# Patient Record
Sex: FEMALE | Race: WHITE
[De-identification: names, ages, dates, MRNs, and addresses within clinical notes are randomized per-mention and may not be internally consistent; named-entity substitution may affect disease eponyms.]

---

## 2018-09-27 ENCOUNTER — HOSPITAL ENCOUNTER (OUTPATIENT)
Dept: HOSPITAL 89 - DIET | Age: 33
LOS: 35 days | End: 2018-11-01
Attending: NURSE PRACTITIONER
Payer: MEDICAID

## 2018-09-27 VITALS
HEIGHT: 64 IN | BODY MASS INDEX: 35 KG/M2 | WEIGHT: 205 LBS | WEIGHT: 205 LBS | BODY MASS INDEX: 35 KG/M2 | HEIGHT: 64 IN

## 2018-09-27 DIAGNOSIS — R63.5: Primary | ICD-10-CM

## 2018-09-27 PROCEDURE — 97802 MEDICAL NUTRITION INDIV IN: CPT

## 2018-09-27 NOTE — MEDICAL NUTRITION THERAPY
Nutrition Anthropometrics


Height (Inches):  64


Weight (Pounds):  205 (standing scale with shoes)


BMI:  35.2


Alfonzo Nutrition Score:          


Alfonzo Nutrition Risk Score:


Dietary Referral


Nutrition Risk Factors:      


Nutrition Risk Comment:





Nutrition/Food History


Breakfast:  eggs and cheese ( not much of a breakfast person)


Lunch:  Some type of meat with veggies


Dinner:  Steak, chicken, or pork, with potatoes corn or salad


Snacks:  grapes or chips





Nutritional Education


Nutrition Education Topic:  Weight Loss Diet


Learning Readiness:  Eager, Interested


Teaching Methods:  Discussion, Handout, Demonstration


Response to Teaching:  Verbalize understanding


Teaching Recipient:  Patient


Nutrition Counselin/27. Met with pt. She is concerned about losing weight so she can get PG.


Pt has tried multiple diet in the past and has been very "ridged" then


goes off and re-gains.  Disussed importance of lifestlye changes rather


than diet.  Focused on eating 3 balanced meals a day, making half the


plate veggeis, 5-6 oz of meat and 1 cup of carbs. Pt noted she tends to


eat a lot at


night for evening meal. Eats lunch at noon then doesn't eat dinner


[id number]. 7 hours without food, recommended pt eat a snack between lunch


and dinner so pt doesnt overeat at dinner meal. discussed how light bffrt


and lunch may cause more hunger in afternoon.  Looking at a 7937-2288 kcal


diet per day, which may decrease depending on tolerance and wt loss.





Nutrition Monitoring & Eval


RD Patient Assessment Time:  60 minutes


RD Assessment Type:  RD Education


Nutritional Comment:  


Provided 60 minutes MNT for wt loss with BMI of 35.2





Copies To


Copies to:   MILLY LUZ ;











NESS BARRERA                    Sep 27, 2018 15:54

## 2019-07-07 ENCOUNTER — HOSPITAL ENCOUNTER (INPATIENT)
Dept: HOSPITAL 89 - OB | Age: 34
LOS: 2 days | Discharge: HOME | End: 2019-07-09
Attending: OBSTETRICS & GYNECOLOGY | Admitting: OBSTETRICS & GYNECOLOGY
Payer: MEDICAID

## 2019-07-07 VITALS — SYSTOLIC BLOOD PRESSURE: 129 MMHG | DIASTOLIC BLOOD PRESSURE: 65 MMHG

## 2019-07-07 VITALS
WEIGHT: 235 LBS | HEIGHT: 64 IN | HEIGHT: 64 IN | BODY MASS INDEX: 40.12 KG/M2 | WEIGHT: 235 LBS | BODY MASS INDEX: 40.12 KG/M2

## 2019-07-07 VITALS — SYSTOLIC BLOOD PRESSURE: 124 MMHG | DIASTOLIC BLOOD PRESSURE: 72 MMHG

## 2019-07-07 VITALS — DIASTOLIC BLOOD PRESSURE: 71 MMHG | SYSTOLIC BLOOD PRESSURE: 129 MMHG

## 2019-07-07 DIAGNOSIS — Z3A.40: ICD-10-CM

## 2019-07-07 DIAGNOSIS — E03.9: ICD-10-CM

## 2019-07-07 LAB — PLATELET COUNT, AUTOMATED: 250 K/UL (ref 150–450)

## 2019-07-07 PROCEDURE — 36415 COLL VENOUS BLD VENIPUNCTURE: CPT

## 2019-07-07 PROCEDURE — 86850 RBC ANTIBODY SCREEN: CPT

## 2019-07-07 PROCEDURE — 86901 BLOOD TYPING SEROLOGIC RH(D): CPT

## 2019-07-07 PROCEDURE — 0KQM0ZZ REPAIR PERINEUM MUSCLE, OPEN APPROACH: ICD-10-PCS | Performed by: OBSTETRICS & GYNECOLOGY

## 2019-07-07 PROCEDURE — 85025 COMPLETE CBC W/AUTO DIFF WBC: CPT

## 2019-07-07 PROCEDURE — 85027 COMPLETE CBC AUTOMATED: CPT

## 2019-07-07 PROCEDURE — 86900 BLOOD TYPING SEROLOGIC ABO: CPT

## 2019-07-07 RX ADMIN — BUPIVACAINE HYDROCHLORIDE PRN ML: 2.5 INJECTION, SOLUTION EPIDURAL; INFILTRATION; INTRACAUDAL; PERINEURAL at 18:19

## 2019-07-07 RX ADMIN — WITCH HAZEL PRN PKG: 5 CLOTH TOPICAL at 22:59

## 2019-07-07 RX ADMIN — SODIUM CHLORIDE, SODIUM LACTATE, POTASSIUM CHLORIDE, AND CALCIUM CHLORIDE PRN MLS/HR: 600; 310; 30; 20 INJECTION, SOLUTION INTRAVENOUS at 18:21

## 2019-07-07 RX ADMIN — SODIUM CHLORIDE, SODIUM LACTATE, POTASSIUM CHLORIDE, AND CALCIUM CHLORIDE PRN MLS/HR: 600; 310; 30; 20 INJECTION, SOLUTION INTRAVENOUS at 16:45

## 2019-07-07 RX ADMIN — DOCUSATE CALCIUM SCH MG: 240 CAPSULE, LIQUID FILLED ORAL at 22:59

## 2019-07-07 RX ADMIN — BUPIVACAINE HYDROCHLORIDE PRN ML: 2.5 INJECTION, SOLUTION EPIDURAL; INFILTRATION; INTRACAUDAL; PERINEURAL at 17:00

## 2019-07-07 RX ADMIN — IBUPROFEN SCH MG: 800 TABLET ORAL at 22:59

## 2019-07-07 NOTE — ANESTHESIA PROGRESS NOTE
Progress/Maintenance


Anesthesia Note Date:  2019


Anesthesia Note Time:  20:18


Pain Intensity:  0


Pump:  Off


Pump Rate (ML/HR):  0


Sensory Level:  L4 L, L 5 rt


Motor Level:  Bending Knees-Bilateral





Assessment and Plan


Assessment:  


Delivery at 1927 female . pt expresses satisfaction with Labor


epidural for relief of pain. She is in bed with baby at breast. The pump


has been off since 1950 when vaginal repair and manual placental


extraction were completed. The epidural catheter is disconnected and


sterilly capped with syringe.


Assessment


Stable alert. residual block receding.


Anesthesia Stop Day:  2019


Anesthesia Stop Time:  19:50











HANS ADKINS CRNA               2019 20:28

## 2019-07-07 NOTE — ANESTHESIA PROGRESS NOTE
Progress/Maintenance


Anesthesia Note Date:  Jul 7, 2019


Anesthesia Note Time:  17:08


Pain Intensity:  6


Pump:  On


Pump Rate (ML/HR):  7


Sensory Level:  T8 left T11 right


Motor Level:  Bending Knees-Bilateral


Position:  Right, Lateral


Drug Bolus:  0.25% Marcaine (6 ml), Other (Fentanyl 50 mcg)


Report Received From:  Other (Miri Rome CRNA)


Care Assumed By:  Other (Hans Garduno CRNA)











HANS GARDUNO CRNA               Jul 7, 2019 20:18

## 2019-07-07 NOTE — OB DELIVERY NOTE
Delivery Note


Vaginal Delivery Type:  Spont. Vaginal Delivery


Delivery Date:  Jul 7, 2019


Delivery Time:  19:27


Estimated Gestational Age(wks):  40.3


Delivery Anesthesia:  Epidural


Infant Sex:  Female


Repair Needed:  Laceration, 2nd Degree


Estimated Blood Loss:  300


Delivery Complications:  Retained Placenta (manual removal)


Notes:


Admitted at 6 cm in active labor.  Progressed after epidural to complete and 


allowed to labor down.  When +2 station, set up for delivery in dorsal lithotomy


position.  Over 2 contractions delivered head over second degree laceration.  


Shoulders delivered spontaneously and rest of baby delivered easily.  The cord 


spontaneously evulsed without much tension and placenta required manual removal.


 A second sweep manually to ensure all membranes and placenta had bee removed 


was performed.  On single dose of Mefoxin 2 gm IV administered to cover 


infection risk.  Repair with 2-0 Chromic without complication.


Pediatrician in Attendence:  No


Copies to:   JIA SYKES MD ;











JIA SYKES MD              Jul 7, 2019 19:58

## 2019-07-07 NOTE — PROCEDURE NOTE
Anesthetic Placement Note


Anesthesia Plan:  CSE


Permit for Anesthesia Signed:  Yes


Anesthesia Technique:  Patient Sitting


Anesthesia Prep:  Chlorhexidine


Interspace:  L 4-5


Local Anesthetic:  1% Lidocaine, 25 Gauge Needle


Amount Local - cc's:  3


Anesthesia Needle:  17g Touhy/Schliff


Anesthesia Attempts:  1


Loss of Resistance:  Normal Saline


Depth of TANVI (cm):  7


Epidural Needle Placement:  No CSF, No Blood, No Parasthesia


Intrathecal Needle:  27 Gauge Pencan


Cerebral Spinal Fluid:  Other (unable to get and caused lt sided parasthesia so 


aborted CSE)


Catheter Insertion (cm):  12


Catheter Type:  Maurice - Spring Wound


Epidural Dressing:  Tegaderm, Tape


Anesthesia Tray:  Lot Number ([id number]), Expiration Date (08312020)





Anesthesia Medications:


Epidural Test Dose:  1.5 Lido/Epi (1:200,000), Dose - mL (3), Time (1659), 


Negative


Epidural Loading Dose:  0.25% Marcaine, Dose - ml (5), Time (1700)


Epidural Infusion:  0.2% Ropivicaine, With Fentanyl 2mcg/ml, Start Time: (1700)


Epidural Pump Setting:  Bolus Dose - mL (3), Lockout - Minutes (30), Maintenance


Rate - mL/hr (7), Maximum per Hour - mL (13)


Complications:  None


Comment:


Pt tolerated procedure well. Vital signs stable. Patient becoming comfortable 


and condition stable.











GARCIA JONES CRNA            Jul 7, 2019 17:13

## 2019-07-07 NOTE — HISTORY & PHYSICAL
History of Present Illness


Age of Patient:  34


:  2


Para or TPAL:  1


EDC per LMP:  2019


Estimated Gestational Age:  40.3


Chief Complaint


Labor


History of Present Illness


Presents in active labor.  Pregnancy complicated by hypothyroidism but well 


controlled with replacement.  Past Medical, Surgical, Family and Obstetric 


Histories reviewed. Please see [facility] prenatal chart.





History


Allergies:  


Coded Allergies:  


     No Known Drug Allergies (Unverified , 16)


Social History:  


: =Nathanael Fang


Denies use of alcohol, tobacco, or recreational drugs during pregnancy


Family History:  


FH: hypertension


  FATHER


  MOTHER


FH: lung cancer


  maternal grandma


  maternal grandpa





No Family History of:


  FH: asthma


  FH: diabetes mellitus


  FH: heart disease


  Malignant hyperthermia





Med Rec


Home Meds


Reported Medications


Levothyroxine Sodium (LEVOTHYROXINE SODIUM) 75 Mcg Tablet, 75 MCG PO QDAY, TAB


   19


Awv131/Iron Fumarate/Fa/Dss (PRENATAL 19 TABLET) 1 Each Tablet


   16


Discontinued Reported Medications


Levothyroxine Sodium (LEVOTHYROXINE SODIUM) 50 Mcg Tablet, 50 MCG PO QDAY, TAB


   16


Discontinued Scripts


Ibuprofen (IBUPROFEN) 800 Mg Tablet, 1 TAB PO Q8H PRN for pain, #30 TAB 0 


Refills


   TAKE WITH FOOD EVERY 8 HOURS


   Prov:JIGAR AREVALO MD         16


Acetaminophen With Codeine # 3 (ACETAMINOPHEN-COD #3 TABLET) 1 Each Tablet, 1-2 


EACH PO Q4H PRN for pain, #40 TAB 0 Refills


   Take 1-2 tablets as needed for pain no closer than every 4 hours.


   Prov:JIGAR AREVALO MD         16





Review of Systems


All Systems Reviewed/Normal:  Yes, Except as Noted





Exam


General Exam


Vital Signs





Vital Signs








  Date Time  Temp Pulse Resp B/P (MAP) Pulse Ox O2 Delivery O2 Flow Rate FiO2


 


19 18:00  96 18 124/72 (89) 97   


 


19 17:53 99.0     Room Air  








General Apperance:  Alert/Awake/No Acute Distress


Neuro:  No Gross deficits


Eyes:  Normal Extraocular Movement & Vison


Cardiovascular:  Regular Rate and Rhythm


Respiratory:  No Respiratory Distress


Abdomen:  Soft, Non-Tender, Non-Distended, Gravid - Non-Tender


Integumentary:  Skin Intact without Lesions or Rash


Psychological:  Alert & Oriented X3, Appropriate Mood & Affect





Pregnancy


Cervical Dialation:  6


Cervical Effacement (%):  100


Fetal Station:  0





Fetus


FHT Category:  I





Medical Decision Making


Data Points


Result Diagram:  


19 3078








VTE Prophylasis: Adult


Deep Vein Thrombosis/Pulmonary:  No


Pharmacological Contraindicati:  Pt at Low Risk for VTE


Mechanical Contraindications:  Pt at Low Risk for VTE





Assessment and Plan


OB/GYN Plan:  Routine Labor Care


Problems:  


(1) 40 weeks gestation of pregnancy


Status:  Resolved


Assessment & Plan:  Expecting 














JIA SYKES MD              2019 19:52

## 2019-07-07 NOTE — ANESTHESIA PROGRESS NOTE
Progress/Maintenance


Anesthesia Note Date:  Jul 7, 2019


Anesthesia Note Time:  18:30


Pain Intensity:  2


Pump:  On


Pump Rate (ML/HR):  7


Sensory Level:  T7 left  T9 right


Motor Level:  Bending Knees-Bilateral (Left motor block > right)


Position:  Semi-HANS Childress CRNA               Jul 7, 2019 20:20

## 2019-07-08 VITALS — DIASTOLIC BLOOD PRESSURE: 79 MMHG | SYSTOLIC BLOOD PRESSURE: 129 MMHG

## 2019-07-08 VITALS — DIASTOLIC BLOOD PRESSURE: 72 MMHG | SYSTOLIC BLOOD PRESSURE: 121 MMHG

## 2019-07-08 VITALS — DIASTOLIC BLOOD PRESSURE: 86 MMHG | SYSTOLIC BLOOD PRESSURE: 125 MMHG

## 2019-07-08 VITALS — SYSTOLIC BLOOD PRESSURE: 129 MMHG | DIASTOLIC BLOOD PRESSURE: 74 MMHG

## 2019-07-08 VITALS — SYSTOLIC BLOOD PRESSURE: 118 MMHG | DIASTOLIC BLOOD PRESSURE: 69 MMHG

## 2019-07-08 VITALS — DIASTOLIC BLOOD PRESSURE: 64 MMHG | SYSTOLIC BLOOD PRESSURE: 138 MMHG

## 2019-07-08 VITALS — SYSTOLIC BLOOD PRESSURE: 108 MMHG | DIASTOLIC BLOOD PRESSURE: 60 MMHG

## 2019-07-08 LAB — PLATELET COUNT, AUTOMATED: 207 K/UL (ref 150–450)

## 2019-07-08 RX ADMIN — IBUPROFEN SCH MG: 800 TABLET ORAL at 12:31

## 2019-07-08 RX ADMIN — DOCUSATE CALCIUM SCH MG: 240 CAPSULE, LIQUID FILLED ORAL at 08:27

## 2019-07-08 RX ADMIN — DOCUSATE CALCIUM SCH MG: 240 CAPSULE, LIQUID FILLED ORAL at 21:39

## 2019-07-08 RX ADMIN — IBUPROFEN SCH MG: 800 TABLET ORAL at 21:39

## 2019-07-08 RX ADMIN — IBUPROFEN SCH MG: 800 TABLET ORAL at 05:43

## 2019-07-08 NOTE — OB/GYN PROGRESS NOTE
OB Subjective


Progress Notes


Subjective


Feeling well. Pain well controlled and bleeding improving.  Voiding well.


GI:  NEG Nausea


:  Voiding Well


Pain:  Mild





OB Objective


Physical Exam





Vital Signs








  Date Time  Temp Pulse Resp B/P (MAP) Pulse Ox O2 Delivery O2 Flow Rate FiO2


 


7/8/19 07:35 98.2 59  118/69 (85)  Room Air  


 


7/7/19 18:00   18  97   














Intake and Output 


 


 7/8/19





 07:02


 


Intake Total 3700 ml


 


Output Total 3900 ml


 


Balance -200 ml


 


 


 


IV Total 1850 ml


 


Other 1850 ml


 


Output Urine Total 3900 ml


 


# Voids 1








General Appearance:  Alert/Awake/No Acute Distress


Neurological:  No Gross deficits


Eyes:  Normal Extraocular Movement & Vison


Cardiovascular:  Normal Rhythm & Peripheral Pulses, Regular Rate and Rhythm


Respiratory:  No Respiratory Distress, Clear to Auscultation


Abdomen:  Soft, Non-Tender, Non-Distended


Integumentary:  Skin Intact without Lesions or Rash


Psychological:  Alert & Oriented X3, Appropriate Mood & Affect


Result Diagram:  


7/8/19 0620








Assessment and Plan


OB/GYN Plan:  Routine Post-Partum Care, Discharge Home Tomorrow


Problems:  


(1) 40 weeks gestation of pregnancy


Status:  Resolved


(2) Postpartum care and examination immediately after delivery


Assessment & Plan:  Discussed precautions and instructions.  Planning discharge 


home tomorrow.  Continue comfort care today.














JIA SYKES MD              Jul 8, 2019 13:27

## 2019-07-08 NOTE — ANESTHESIA POST EVAL NOTE
Anesthesia Post Eval Note





Vital Signs








  Date Time  Temp Pulse Resp B/P (MAP) Pulse Ox O2 Delivery O2 Flow Rate FiO2


 


7/8/19 07:35 98.2 59  118/69 (85)  Room Air  


 


7/7/19 18:00   18  97   








Pt able to participate in Eval:  Yes


Cardiovascular Status:  Satisfactory


Respiratory Status:  Satisfactory


Pain Managment:  Satisfactory


PO Nausea/Vomiting:  Satisfactory


Temperature Management:  Satisfactory


Mental Status:  Satisfactory, Alert, Oriented X3


Post-Op Hydration Status:  Satisfactory, Tolerating PO Well, Voiding w/o 


Difficulty


Anesthesia Type:  LEB


Anesthesia Tolerance:


Pt has ambulated and is about to shower. Baby feeding well. Signs and sx of 


infection reviewed and pt agrees to seek medical advice should any occur.











HANS ADKINS CRNA               Jul 8, 2019 11:46

## 2019-07-09 VITALS — DIASTOLIC BLOOD PRESSURE: 83 MMHG | SYSTOLIC BLOOD PRESSURE: 117 MMHG

## 2019-07-09 VITALS — SYSTOLIC BLOOD PRESSURE: 108 MMHG | DIASTOLIC BLOOD PRESSURE: 59 MMHG

## 2019-07-09 VITALS — SYSTOLIC BLOOD PRESSURE: 120 MMHG | DIASTOLIC BLOOD PRESSURE: 81 MMHG

## 2019-07-09 VITALS — DIASTOLIC BLOOD PRESSURE: 95 MMHG | SYSTOLIC BLOOD PRESSURE: 127 MMHG

## 2019-07-09 RX ADMIN — IBUPROFEN SCH MG: 800 TABLET ORAL at 12:47

## 2019-07-09 RX ADMIN — WITCH HAZEL PRN PKG: 5 CLOTH TOPICAL at 12:47

## 2019-07-09 RX ADMIN — IBUPROFEN SCH MG: 800 TABLET ORAL at 05:00

## 2019-07-09 RX ADMIN — DOCUSATE CALCIUM SCH MG: 240 CAPSULE, LIQUID FILLED ORAL at 09:15

## 2019-07-09 NOTE — OB/GYN PROGRESS NOTE
OB Subjective


Progress Notes


Subjective


Feeling much better today.  Wants to go home.


GI:  NEG Nausea


:  Voiding Well


Pain:  Mild





OB Objective


Physical Exam





Vital Signs








  Date Time  Temp Pulse Resp B/P (MAP) Pulse Ox O2 Delivery O2 Flow Rate FiO2


 


7/9/19 04:09 97.4  16 108/59 (75)  Room Air  


 


7/9/19 01:00  63      


 


7/7/19 18:00     97   














Intake and Output 


 


 7/9/19





 07:02


 


Intake Total 1480 ml


 


Balance 1480 ml


 


 


 


Intake Oral 1480 ml


 


# Voids 4


 


# Bowel Movements 1








General Appearance:  Alert/Awake/No Acute Distress


Neurological:  No Gross deficits


Eyes:  Normal Extraocular Movement & Vison


Cardiovascular:  Normal Rhythm & Peripheral Pulses, Regular Rate and Rhythm


Respiratory:  No Respiratory Distress, Clear to Auscultation


Abdomen:  Soft, Non-Tender, Non-Distended, Fundus Firm, Non-Tender


Integumentary:  Skin Intact without Lesions or Rash


Psychological:  Alert & Oriented X3, Appropriate Mood & Affect


Result Diagram:  


7/8/19 0620








Assessment and Plan


OB/GYN Plan:  Routine Post-Partum Care, Discharge Home Today


Problems:  


(1) 40 weeks gestation of pregnancy


Status:  Resolved


(2) Postpartum care and examination immediately after delivery











JIA SYKES MD              Jul 9, 2019 08:25

## 2019-07-09 NOTE — OB/GYN DISCHARGE SUMMARY
Discharge Summary


Reason for Hosp/Final Diag:  


(1) 40 weeks gestation of pregnancy


Status:  Resolved


(2) Postpartum care and examination immediately after delivery


Hospital Course & Plan:  s/p 





Lates Vital Signs





Vital Signs








  Date Time  Temp Pulse Resp B/P (MAP) Pulse Ox O2 Delivery O2 Flow Rate FiO2


 


19 04:09 97.4  16 108/59 (75)  Room Air  


 


19 01:00  63      


 


19 18:00     97   








Weight (Pounds):  235


Result Diagram:  


19 0620





Condition:  Improved


Discharge:  Home


Home Meds


Reported Medications


Levothyroxine Sodium (LEVOTHYROXINE SODIUM) 75 Mcg Tablet, 75 MCG PO QDAY, TAB


   19


Mhr549/Iron Fumarate/Fa/Dss (PRENATAL 19 TABLET) 1 Each Tablet


   16


Discontinued Reported Medications


Levothyroxine Sodium (LEVOTHYROXINE SODIUM) 50 Mcg Tablet, 50 MCG PO QDAY, TAB


   16


Discontinued Scripts


Ibuprofen (IBUPROFEN) 800 Mg Tablet, 1 TAB PO Q8H PRN for pain, #30 TAB 0 


Refills


   TAKE WITH FOOD EVERY 8 HOURS


   Prov:JIGAR AREVALO MD         16


Acetaminophen With Codeine # 3 (ACETAMINOPHEN-COD #3 TABLET) 1 Each Tablet, 1-2 


EACH PO Q4H PRN for pain, #40 TAB 0 Refills


   Take 1-2 tablets as needed for pain no closer than every 4 hours.


   Prov:JIGAR AREVALO MD         16


Follow up Referrals:  


OB/GYN - In 6 Weeks @ [facility] with JIA STAFFORD MD





Follow up with:  Dr. Stafford [phone]


Follow up in:  6 wks PP or PO


Discharge Diet:  As Tolerates


Discharge Activity:  As Tolerates, No Heavy Lifting x 6 wks, No Heavy Lifting > 


10lb, Pelvic Rest


Copies to:   JIA STAFFORD MD ;











JIA STAFFORD MD              2019 08:27